# Patient Record
Sex: MALE | Race: WHITE | NOT HISPANIC OR LATINO | Employment: UNEMPLOYED | URBAN - METROPOLITAN AREA
[De-identification: names, ages, dates, MRNs, and addresses within clinical notes are randomized per-mention and may not be internally consistent; named-entity substitution may affect disease eponyms.]

---

## 2021-04-22 ENCOUNTER — APPOINTMENT (EMERGENCY)
Dept: CT IMAGING | Facility: HOSPITAL | Age: 36
End: 2021-04-22
Payer: COMMERCIAL

## 2021-04-22 ENCOUNTER — HOSPITAL ENCOUNTER (EMERGENCY)
Facility: HOSPITAL | Age: 36
Discharge: HOME | End: 2021-04-22
Attending: EMERGENCY MEDICINE
Payer: COMMERCIAL

## 2021-04-22 VITALS
RESPIRATION RATE: 22 BRPM | BODY MASS INDEX: 30.84 KG/M2 | DIASTOLIC BLOOD PRESSURE: 87 MMHG | TEMPERATURE: 98.3 F | HEART RATE: 102 BPM | OXYGEN SATURATION: 97 % | SYSTOLIC BLOOD PRESSURE: 142 MMHG | WEIGHT: 215.39 LBS | HEIGHT: 70 IN

## 2021-04-22 DIAGNOSIS — F10.239 ALCOHOL WITHDRAWAL (HCC): Primary | ICD-10-CM

## 2021-04-22 DIAGNOSIS — R56.9 SEIZURE (HCC): ICD-10-CM

## 2021-04-22 LAB
ALBUMIN SERPL BCP-MCNC: 4.6 G/DL (ref 3.5–5)
ALP SERPL-CCNC: 94 U/L (ref 46–116)
ALT SERPL W P-5'-P-CCNC: 74 U/L (ref 12–78)
ANION GAP SERPL CALCULATED.3IONS-SCNC: 25 MMOL/L (ref 4–13)
AST SERPL W P-5'-P-CCNC: 128 U/L (ref 5–45)
ATRIAL RATE: 100 BPM
BASOPHILS # BLD AUTO: 0.05 THOUSANDS/ΜL (ref 0–0.1)
BASOPHILS NFR BLD AUTO: 1 % (ref 0–1)
BILIRUB SERPL-MCNC: 0.72 MG/DL (ref 0.2–1)
BUN SERPL-MCNC: 9 MG/DL (ref 5–25)
CALCIUM SERPL-MCNC: 8.7 MG/DL (ref 8.3–10.1)
CHLORIDE SERPL-SCNC: 102 MMOL/L (ref 100–108)
CO2 SERPL-SCNC: 12 MMOL/L (ref 21–32)
CREAT SERPL-MCNC: 1.12 MG/DL (ref 0.6–1.3)
EOSINOPHIL # BLD AUTO: 0.01 THOUSAND/ΜL (ref 0–0.61)
EOSINOPHIL NFR BLD AUTO: 0 % (ref 0–6)
ERYTHROCYTE [DISTWIDTH] IN BLOOD BY AUTOMATED COUNT: 16.6 % (ref 11.6–15.1)
GFR SERPL CREATININE-BSD FRML MDRD: 85 ML/MIN/1.73SQ M
GLUCOSE SERPL-MCNC: 189 MG/DL (ref 65–140)
HCT VFR BLD AUTO: 36.3 % (ref 36.5–49.3)
HGB BLD-MCNC: 10.3 G/DL (ref 12–17)
IMM GRANULOCYTES # BLD AUTO: 0.05 THOUSAND/UL (ref 0–0.2)
IMM GRANULOCYTES NFR BLD AUTO: 1 % (ref 0–2)
LYMPHOCYTES # BLD AUTO: 0.25 THOUSANDS/ΜL (ref 0.6–4.47)
LYMPHOCYTES NFR BLD AUTO: 4 % (ref 14–44)
MAGNESIUM SERPL-MCNC: 2.8 MG/DL (ref 1.6–2.6)
MCH RBC QN AUTO: 23.5 PG (ref 26.8–34.3)
MCHC RBC AUTO-ENTMCNC: 28.4 G/DL (ref 31.4–37.4)
MCV RBC AUTO: 83 FL (ref 82–98)
MONOCYTES # BLD AUTO: 0.41 THOUSAND/ΜL (ref 0.17–1.22)
MONOCYTES NFR BLD AUTO: 6 % (ref 4–12)
NEUTROPHILS # BLD AUTO: 6.29 THOUSANDS/ΜL (ref 1.85–7.62)
NEUTS SEG NFR BLD AUTO: 88 % (ref 43–75)
NRBC BLD AUTO-RTO: 0 /100 WBCS
P AXIS: 48 DEGREES
PLATELET # BLD AUTO: 143 THOUSANDS/UL (ref 149–390)
PMV BLD AUTO: 10.9 FL (ref 8.9–12.7)
POTASSIUM SERPL-SCNC: 3.9 MMOL/L (ref 3.5–5.3)
PR INTERVAL: 146 MS
PROT SERPL-MCNC: 9.1 G/DL (ref 6.4–8.2)
QRS AXIS: 61 DEGREES
QRSD INTERVAL: 72 MS
QT INTERVAL: 364 MS
QTC INTERVAL: 469 MS
RBC # BLD AUTO: 4.38 MILLION/UL (ref 3.88–5.62)
SODIUM SERPL-SCNC: 139 MMOL/L (ref 136–145)
T WAVE AXIS: 54 DEGREES
TROPONIN I SERPL-MCNC: <0.02 NG/ML
VENTRICULAR RATE: 100 BPM
WBC # BLD AUTO: 7.06 THOUSAND/UL (ref 4.31–10.16)

## 2021-04-22 PROCEDURE — 93010 ELECTROCARDIOGRAM REPORT: CPT | Performed by: INTERNAL MEDICINE

## 2021-04-22 PROCEDURE — 96376 TX/PRO/DX INJ SAME DRUG ADON: CPT

## 2021-04-22 PROCEDURE — 93005 ELECTROCARDIOGRAM TRACING: CPT

## 2021-04-22 PROCEDURE — 85025 COMPLETE CBC W/AUTO DIFF WBC: CPT | Performed by: EMERGENCY MEDICINE

## 2021-04-22 PROCEDURE — 36415 COLL VENOUS BLD VENIPUNCTURE: CPT | Performed by: EMERGENCY MEDICINE

## 2021-04-22 PROCEDURE — 96375 TX/PRO/DX INJ NEW DRUG ADDON: CPT

## 2021-04-22 PROCEDURE — 84484 ASSAY OF TROPONIN QUANT: CPT | Performed by: EMERGENCY MEDICINE

## 2021-04-22 PROCEDURE — 80053 COMPREHEN METABOLIC PANEL: CPT | Performed by: EMERGENCY MEDICINE

## 2021-04-22 PROCEDURE — 99285 EMERGENCY DEPT VISIT HI MDM: CPT

## 2021-04-22 PROCEDURE — 83735 ASSAY OF MAGNESIUM: CPT | Performed by: EMERGENCY MEDICINE

## 2021-04-22 PROCEDURE — 99285 EMERGENCY DEPT VISIT HI MDM: CPT | Performed by: EMERGENCY MEDICINE

## 2021-04-22 PROCEDURE — 70450 CT HEAD/BRAIN W/O DYE: CPT

## 2021-04-22 PROCEDURE — G1004 CDSM NDSC: HCPCS

## 2021-04-22 PROCEDURE — 96361 HYDRATE IV INFUSION ADD-ON: CPT

## 2021-04-22 PROCEDURE — 96374 THER/PROPH/DIAG INJ IV PUSH: CPT

## 2021-04-22 RX ORDER — SERTRALINE HYDROCHLORIDE 25 MG/1
50 TABLET, FILM COATED ORAL DAILY
COMMUNITY

## 2021-04-22 RX ORDER — ONDANSETRON 2 MG/ML
4 INJECTION INTRAMUSCULAR; INTRAVENOUS ONCE
Status: COMPLETED | OUTPATIENT
Start: 2021-04-22 | End: 2021-04-22

## 2021-04-22 RX ORDER — LORAZEPAM 2 MG/ML
1 INJECTION INTRAMUSCULAR ONCE
Status: COMPLETED | OUTPATIENT
Start: 2021-04-22 | End: 2021-04-22

## 2021-04-22 RX ORDER — ONDANSETRON 4 MG/1
4 TABLET, ORALLY DISINTEGRATING ORAL EVERY 6 HOURS PRN
Qty: 20 TABLET | Refills: 0 | Status: SHIPPED | OUTPATIENT
Start: 2021-04-22

## 2021-04-22 RX ORDER — LORAZEPAM 1 MG/1
1 TABLET ORAL 3 TIMES DAILY PRN
Qty: 15 TABLET | Refills: 0 | Status: SHIPPED | OUTPATIENT
Start: 2021-04-22 | End: 2021-05-02

## 2021-04-22 RX ORDER — ONDANSETRON 2 MG/ML
INJECTION INTRAMUSCULAR; INTRAVENOUS
Status: COMPLETED
Start: 2021-04-22 | End: 2021-04-22

## 2021-04-22 RX ADMIN — LORAZEPAM 1 MG: 2 INJECTION INTRAMUSCULAR; INTRAVENOUS at 13:05

## 2021-04-22 RX ADMIN — LORAZEPAM 1 MG: 2 INJECTION INTRAMUSCULAR; INTRAVENOUS at 14:35

## 2021-04-22 RX ADMIN — ONDANSETRON 4 MG: 2 INJECTION INTRAMUSCULAR; INTRAVENOUS at 16:30

## 2021-04-22 RX ADMIN — SODIUM CHLORIDE 1000 ML: 0.9 INJECTION, SOLUTION INTRAVENOUS at 13:05

## 2021-04-22 RX ADMIN — LORAZEPAM 1 MG: 2 INJECTION INTRAMUSCULAR; INTRAVENOUS at 16:37

## 2021-04-22 NOTE — ED PROVIDER NOTES
History  Chief Complaint   Patient presents with    Seizure - New Onset     Pt brought in via EMS following a new seizure at home, witnessed by family, no hx of same  States he drinks approx 6-12 beers a day   Withdrawal - Alcohol     81-year-old male presents with likely alcohol withdrawal seizure  Patient states that he drinks every day, at least 6-12 beers a day  Yesterday he only drinks 3 beers  This morning had likely alcohol withdrawal seizure, no history of alcohol withdrawal seizures in the past   Father was there and witnessed the event  States that he was having full body convulsions, eyes rolled back into his head, lasted for approximately 3 minutes, did not hit his head  No hallucinations, no tremors  No other complaints at this time  He is feeling improved now  Given Ativan, has felt normal since receiving Ativan  Prior to Admission Medications   Prescriptions Last Dose Informant Patient Reported? Taking?   sertraline (ZOLOFT) 25 mg tablet   Yes Yes   Sig: Take 50 mg by mouth daily      Facility-Administered Medications: None       Past Medical History:   Diagnosis Date    [facility]     Alcohol abuse     PTSD (post-traumatic stress disorder)        History reviewed  No pertinent surgical history  History reviewed  No pertinent family history  I have reviewed and agree with the history as documented  E-Cigarette/Vaping     E-Cigarette/Vaping Substances     Social History     Tobacco Use    Smoking status: Former Smoker    Smokeless tobacco: Never Used   Substance Use Topics    Alcohol use: Yes     Frequency: 4 or more times a week     Drinks per session: 10 or more     Binge frequency: Daily or almost daily    Drug use: Yes     Types: Marijuana       Review of Systems   Constitutional: Positive for appetite change, diaphoresis and fatigue  Negative for chills and fever  HENT: Negative for congestion and rhinorrhea  Eyes: Negative for visual disturbance  Respiratory: Negative for chest tightness and shortness of breath  Cardiovascular: Negative for chest pain and leg swelling  Gastrointestinal: Negative for abdominal pain, nausea and vomiting  Musculoskeletal: Negative for back pain, neck pain and neck stiffness  Skin: Negative for wound  Neurological: Positive for seizures  Negative for dizziness, tremors, syncope, weakness, light-headedness, numbness and headaches  Hematological: Does not bruise/bleed easily  Psychiatric/Behavioral: Negative for confusion  Physical Exam  Physical Exam  Vitals signs reviewed  Constitutional:       General: He is not in acute distress  Appearance: He is well-developed  He is ill-appearing  He is not toxic-appearing or diaphoretic  Comments: Overweight   HENT:      Head: Normocephalic and atraumatic  Eyes:      General: No scleral icterus  Right eye: No discharge  Left eye: No discharge  Conjunctiva/sclera: Conjunctivae normal       Pupils: Pupils are equal, round, and reactive to light  Neck:      Musculoskeletal: Normal range of motion and neck supple  No neck rigidity  Vascular: No JVD  Cardiovascular:      Rate and Rhythm: Normal rate and regular rhythm  Heart sounds: Normal heart sounds  No murmur  No friction rub  No gallop  Pulmonary:      Effort: Pulmonary effort is normal  No respiratory distress  Breath sounds: Normal breath sounds  No wheezing or rales  Chest:      Chest wall: No tenderness  Abdominal:      General: Bowel sounds are normal  There is no distension  Palpations: Abdomen is soft  Tenderness: There is no abdominal tenderness  There is no guarding or rebound  Musculoskeletal: Normal range of motion  General: No tenderness or deformity  Right lower leg: No edema  Left lower leg: No edema  Skin:     General: Skin is warm and dry  Coloration: Skin is not pale  Findings: No erythema or rash  Neurological:      General: No focal deficit present  Mental Status: He is alert and oriented to person, place, and time  Cranial Nerves: No cranial nerve deficit  Sensory: No sensory deficit  Motor: No weakness        Gait: Gait normal       Comments: No tremors   Psychiatric:         Mood and Affect: Mood normal          Behavior: Behavior normal          Vital Signs  ED Triage Vitals   Temperature Pulse Respirations Blood Pressure SpO2   04/22/21 1315 04/22/21 1248 04/22/21 1248 04/22/21 1248 04/22/21 1248   98 3 °F (36 8 °C) (!) 110 22 140/88 98 %      Temp Source Heart Rate Source Patient Position - Orthostatic VS BP Location FiO2 (%)   04/22/21 1315 04/22/21 1248 04/22/21 1248 04/22/21 1248 --   Oral Monitor Lying Right arm       Pain Score       04/22/21 1248       8           Vitals:    04/22/21 1400 04/22/21 1430 04/22/21 1500 04/22/21 1530   BP: 127/77 135/84 139/87 142/87   Pulse: 102 (!) 109 102 102   Patient Position - Orthostatic VS: Lying Lying           Visual Acuity  Visual Acuity      Most Recent Value   L Pupil Size (mm)  3   R Pupil Size (mm)  3          ED Medications  Medications   LORazepam (ATIVAN) injection 1 mg (1 mg Intravenous Given 4/22/21 1305)   sodium chloride 0 9 % bolus 1,000 mL (0 mL Intravenous Stopped 4/22/21 1646)   LORazepam (ATIVAN) injection 1 mg (1 mg Intravenous Given 4/22/21 1435)   ondansetron (ZOFRAN) injection 4 mg (4 mg Intravenous Given 4/22/21 1630)   LORazepam (ATIVAN) injection 1 mg (1 mg Intravenous Given 4/22/21 1637)       Diagnostic Studies  Results Reviewed     Procedure Component Value Units Date/Time    Troponin I [[id number]]  (Normal) Collected: 04/22/21 1305    Lab Status: Final result Specimen: Blood from Arm, Right Updated: 04/22/21 1335     Troponin I <0 02 ng/mL     Comprehensive metabolic panel [[id number]]  (Abnormal) Collected: 04/22/21 1306    Lab Status: Final result Specimen: Blood from Arm, Right Updated: 04/22/21 1332 Sodium 139 mmol/L      Potassium 3 9 mmol/L      Chloride 102 mmol/L      CO2 12 mmol/L      ANION GAP 25 mmol/L      BUN 9 mg/dL      Creatinine 1 12 mg/dL      Glucose 189 mg/dL      Calcium 8 7 mg/dL       U/L      ALT 74 U/L      Alkaline Phosphatase 94 U/L      Total Protein 9 1 g/dL      Albumin 4 6 g/dL      Total Bilirubin 0 72 mg/dL      eGFR 85 ml/min/1 73sq m     Narrative:      National Kidney Disease Foundation guidelines for Chronic Kidney Disease (CKD):     Stage 1 with normal or high GFR (GFR > 90 mL/min/1 73 square meters)    Stage 2 Mild CKD (GFR = 60-89 mL/min/1 73 square meters)    Stage 3A Moderate CKD (GFR = 45-59 mL/min/1 73 square meters)    Stage 3B Moderate CKD (GFR = 30-44 mL/min/1 73 square meters)    Stage 4 Severe CKD (GFR = 15-29 mL/min/1 73 square meters)    Stage 5 End Stage CKD (GFR <15 mL/min/1 73 square meters)  Note: GFR calculation is accurate only with a steady state creatinine    Magnesium [[id number]]  (Abnormal) Collected: 04/22/21 1306    Lab Status: Final result Specimen: Blood from Arm, Right Updated: 04/22/21 1332     Magnesium 2 8 mg/dL     CBC and differential [[id number]]  (Abnormal) Collected: 04/22/21 1305    Lab Status: Final result Specimen: Blood from Arm, Right Updated: 04/22/21 1317     WBC 7 06 Thousand/uL      RBC 4 38 Million/uL      Hemoglobin 10 3 g/dL      Hematocrit 36 3 %      MCV 83 fL      MCH 23 5 pg      MCHC 28 4 g/dL      RDW 16 6 %      MPV 10 9 fL      Platelets 630 Thousands/uL      nRBC 0 /100 WBCs      Neutrophils Relative 88 %      Immat GRANS % 1 %      Lymphocytes Relative 4 %      Monocytes Relative 6 %      Eosinophils Relative 0 %      Basophils Relative 1 %      Neutrophils Absolute 6 29 Thousands/µL      Immature Grans Absolute 0 05 Thousand/uL      Lymphocytes Absolute 0 25 Thousands/µL      Monocytes Absolute 0 41 Thousand/µL      Eosinophils Absolute 0 01 Thousand/µL      Basophils Absolute 0 05 Thousands/µL CT head without contrast   ED Interpretation by Lo Hendrix DO (04/22 1429)   No acute intracranial abnormality      Final Result by Codi Salcedo MD (04/22 1346)      No acute intracranial abnormality  Workstation performed: [id number]                    Procedures  ECG 12 Lead Documentation Only    Date/Time: 4/22/2021 12:55 PM  Performed by: Lo Hendrix DO  Authorized by: Lo Hendrix DO     Indications / Diagnosis:  New onset seizure  ECG reviewed by me, the ED Provider: yes    Patient location:  ED  Previous ECG:     Previous ECG:  Unavailable    Comparison to cardiac monitor: Yes    Interpretation:     Interpretation: normal    Rate:     ECG rate:  100    ECG rate assessment: normal    Rhythm:     Rhythm: sinus tachycardia    Ectopy:     Ectopy: none    QRS:     QRS axis:  Normal    QRS intervals:  Normal  Conduction:     Conduction: normal    ST segments:     ST segments:  Normal  T waves:     T waves: normal               ED Course  ED Course as of May 10 1900   Thu Apr 22, 2021   1429 Magnesium(!): 2 8   1456 Discussed with patient normal results thus far, and the importance of admission to the detox unit to help him withdraw safely from EtOH      1627 Multiple discussions with the patient and father of patient considering inpatient detox  Patient continues to refuse inpatient detox as he wants to be discharged home to attempt to withdraw from alcohol at home  He states that he did very well with Ativan and he would like outpatient Ativan to help with the symptoms  SBIRT 22yo+      Most Recent Value   SBIRT (22 yo +)   In order to provide better care to our patients, we are screening all of our patients for alcohol and drug use  Would it be okay to ask you these screening questions? Yes Filed at: 04/22/2021 1313   Initial Alcohol Screen: US AUDIT-C    1  How often do you have a drink containing alcohol?   6 Filed at: 04/22/2021 1313   2  How many drinks containing alcohol do you have on a typical day you are drinking? 6 Filed at: 04/22/2021 1313   3a  Male UNDER 65: How often do you have five or more drinks on one occasion? 6 Filed at: 04/22/2021 1313   Audit-C Score  (!) 18 Filed at: 04/22/2021 1313   Full Alcohol Screen: US AUDIT   4  How often during the last year have you found that you were not able to stop drinking once you had started? 4 Filed at: 04/22/2021 1313   5  How often during past year have you failed to do what was normally expected of you because of drinking? 4 Filed at: 04/22/2021 1313   6  How often in past year have you needed a first drink in the morning to get yourself going after a heavy drinking session? 4 Filed at: 04/22/2021 1313   7  How often in past year have you had feeling of guilt or remorse after drinking? 1 Filed at: 04/22/2021 1313   8  How often in past year have you been unable to remember what happened night before because you had been drinking? 3 Filed at: 04/22/2021 1313   9  Have you or someone else been injured as a result of your drinking? 2 Filed at: 04/22/2021 1313   10  Has a relative, friend, doctor or other health worker been concerned about your drinking and suggested you cut down? 4 Filed at: 04/22/2021 1313   AUDIT Total Score  (!) 40 Filed at: 04/22/2021 1313   RAS: How many times in the past year have you    Used an illegal drug or used a prescription medication for non-medical reasons? Monthly Filed at: 04/22/2021 1313   DAST-10: In the past 12 months      1  Have you used drugs other than those required for medical reasons? 0 Filed at: 04/22/2021 1313   2  Do you use more than one drug at a time? 0 Filed at: 04/22/2021 1313   3  Have you had medical problems as a result of your drug use (e g , memory loss, hepatitis, convulsions, bleeding, etc )? 0 Filed at: 04/22/2021 1313   4   Have you had "blackouts" or "flashbacks" as a result of drug use?YesNo  0 Filed at: 04/22/2021 1313   5  Do you ever feel bad or guilty about your drug use? 0 Filed at: 04/22/2021 1313   6  Does your spouse (or parent) ever complain about your involvement with drugs? 0 Filed at: 04/22/2021 1313   7  Have you neglected your family because of your use of drugs? 0 Filed at: 04/22/2021 1313   8  Have you engaged in illegal activities in order to obtain drugs? 0 Filed at: 04/22/2021 1313   9  Have you ever experienced withdrawal symptoms (felt sick) when you stopped taking drugs? 0 Filed at: 04/22/2021 1313   10  Are you always able to stop using drugs when you want to?  0 Filed at: 04/22/2021 1313   DAST-10 Score  0 Filed at: 04/22/2021 1313                    MDM  Number of Diagnoses or Management Options  Alcohol withdrawal [facility]):   Seizure [facility]):   Diagnosis management comments: Alcohol withdrawal seizures  Patient offered to go to detox unit however he refuses to state any relief if it safely detox at home  Patient is given a script for Ativan to help with the symptoms in the meantime as Ativan greatly helped his symptoms here  Patient has good return precautions in case he needs further care, needs inpatient care, continues to have seizures, any signs of delirium tremens, or any other concerns  Patient discharged to care with his father in stable condition         Amount and/or Complexity of Data Reviewed  Clinical lab tests: ordered and reviewed  Tests in the radiology section of CPT®: ordered and reviewed        Disposition  Final diagnoses:   Alcohol withdrawal ([facility] )   Seizure ([facility] )     Time reflects when diagnosis was documented in both MDM as applicable and the Disposition within this note     Time User Action Codes Description Comment    4/22/2021  4:28 PM Brenda Rosario Add [F10 239] Alcohol withdrawal ([facility] )     4/22/2021  4:28 PM Brenda oRsario Add [R56 9] Seizure [facility])       ED Disposition     ED Disposition Condition Date/Time Comment Discharge Stable Thu Apr 22, 2021  4:27 PM [place] discharge to home/self care  Follow-up Information     Follow up With Specialties Details Why Contact Info Additional [address] [facility] Emergency Department Emergency Medicine Go to  If symptoms worsen: repeat seizures, worsening withdrawal symptoms (hallucinations, altered mental status, etc) [address] [facility] Emergency Department, [address] [place] [address], [id number]    your PCP for help w alcohol withdrawal         Neurology [facility]  for seizure work up [address]  [phone] Neurology [facility], [address] [place], [place], [address] [facility]          Discharge Medication List as of 4/22/2021  4:34 PM      START taking these medications    Details   LORazepam (Ativan) 1 mg tablet Take 1 tablet (1 mg total) by mouth 3 (three) times a day as needed for seizures (alcohol withdrawal) for up to 10 days, Starting Thu 4/22/2021, Until Sun 5/2/2021, Normal      ondansetron (ZOFRAN-ODT) 4 mg disintegrating tablet Take 1 tablet (4 mg total) by mouth every 6 (six) hours as needed for nausea or vomiting, Starting Thu 4/22/2021, Normal         CONTINUE these medications which have NOT CHANGED    Details   sertraline (ZOLOFT) 25 mg tablet Take 50 mg by mouth daily, Historical Med           No discharge procedures on file      PDMP Review     None          ED Provider  Electronically Signed by           Yadira Austin DO  05/10/21 3614

## 2021-04-22 NOTE — ED NOTES
Pt was informed of resources available that we could get set up for him, both RN and provider discussed rehab; pt responded by saying he had no interest in doing so at present time, perhaps in the future        Andria Frias RN  04/22/21 6330